# Patient Record
Sex: FEMALE | URBAN - METROPOLITAN AREA
[De-identification: names, ages, dates, MRNs, and addresses within clinical notes are randomized per-mention and may not be internally consistent; named-entity substitution may affect disease eponyms.]

---

## 2019-04-23 ENCOUNTER — HOSPITAL ENCOUNTER (OUTPATIENT)
Dept: TELEMEDICINE | Facility: HOSPITAL | Age: 24
Discharge: HOME | End: 2019-04-23

## 2019-04-23 NOTE — SUBJECTIVE & OBJECTIVE
Woke up with symptoms?: {YES NO:24985}  Last known normal Date: 2019-04-23   Last known normal Time: 12:35:00       Recent bleeding noted: {Yes / No / Unknown:74}  Does the patient take any Blood Thinners? {Yes / No / Unknown:74}  Medications: {[facility] Medications:37213}      Past Medical History: {[facility] MEDICAL HX:41284}    Past Surgical History: {[facility] SURGICAL HX:74648}    Family History: {[facility] MEDICAL HX:18832}    Social History: {[facility] SOCIAL HX:11755}    Allergies: Allergies have not been reviewed {[facility] ALLERGIES:44390}    Review of Systems  Objective:   Vitals: There were no vitals taken for this visit. {[facility] VITALS:68815}    CT READ: {[facility] CT READ:05642}    Physical Exam